# Patient Record
Sex: MALE | Race: WHITE | NOT HISPANIC OR LATINO | Employment: FULL TIME | URBAN - METROPOLITAN AREA
[De-identification: names, ages, dates, MRNs, and addresses within clinical notes are randomized per-mention and may not be internally consistent; named-entity substitution may affect disease eponyms.]

---

## 2017-10-03 ENCOUNTER — TRANSCRIBE ORDERS (OUTPATIENT)
Dept: ADMINISTRATIVE | Facility: HOSPITAL | Age: 42
End: 2017-10-03

## 2017-10-03 ENCOUNTER — APPOINTMENT (OUTPATIENT)
Dept: RADIOLOGY | Facility: MEDICAL CENTER | Age: 42
End: 2017-10-03
Payer: COMMERCIAL

## 2017-10-03 DIAGNOSIS — M54.5 LOW BACK PAIN, UNSPECIFIED BACK PAIN LATERALITY, UNSPECIFIED CHRONICITY, WITH SCIATICA PRESENCE UNSPECIFIED: Primary | ICD-10-CM

## 2017-10-03 DIAGNOSIS — M54.5 LOW BACK PAIN, UNSPECIFIED BACK PAIN LATERALITY, UNSPECIFIED CHRONICITY, WITH SCIATICA PRESENCE UNSPECIFIED: ICD-10-CM

## 2017-10-03 PROCEDURE — 72100 X-RAY EXAM L-S SPINE 2/3 VWS: CPT

## 2025-03-30 ENCOUNTER — HOSPITAL ENCOUNTER (EMERGENCY)
Facility: HOSPITAL | Age: 50
Discharge: HOME | End: 2025-03-30
Attending: EMERGENCY MEDICINE
Payer: COMMERCIAL

## 2025-03-30 ENCOUNTER — APPOINTMENT (EMERGENCY)
Dept: NON INVASIVE DIAGNOSTICS | Facility: HOSPITAL | Age: 50
End: 2025-03-30
Payer: COMMERCIAL

## 2025-03-30 VITALS
TEMPERATURE: 98.6 F | SYSTOLIC BLOOD PRESSURE: 130 MMHG | WEIGHT: 223.11 LBS | DIASTOLIC BLOOD PRESSURE: 80 MMHG | RESPIRATION RATE: 18 BRPM | HEART RATE: 70 BPM | OXYGEN SATURATION: 96 %

## 2025-03-30 DIAGNOSIS — I82.451 ACUTE DEEP VEIN THROMBOSIS (DVT) OF RIGHT PERONEAL VEIN (HCC): Primary | ICD-10-CM

## 2025-03-30 LAB
ALBUMIN SERPL BCG-MCNC: 4.5 G/DL (ref 3.5–5)
ALP SERPL-CCNC: 40 U/L (ref 34–104)
ALT SERPL W P-5'-P-CCNC: 26 U/L (ref 7–52)
ANION GAP SERPL CALCULATED.3IONS-SCNC: 4 MMOL/L (ref 4–13)
APTT PPP: 26 SECONDS (ref 23–34)
AST SERPL W P-5'-P-CCNC: 22 U/L (ref 13–39)
BASOPHILS # BLD AUTO: 0.03 THOUSANDS/ÂΜL (ref 0–0.1)
BASOPHILS NFR BLD AUTO: 1 % (ref 0–1)
BILIRUB SERPL-MCNC: 0.6 MG/DL (ref 0.2–1)
BUN SERPL-MCNC: 15 MG/DL (ref 5–25)
CALCIUM SERPL-MCNC: 9.2 MG/DL (ref 8.4–10.2)
CHLORIDE SERPL-SCNC: 104 MMOL/L (ref 96–108)
CO2 SERPL-SCNC: 29 MMOL/L (ref 21–32)
CREAT SERPL-MCNC: 0.94 MG/DL (ref 0.6–1.3)
EOSINOPHIL # BLD AUTO: 0.1 THOUSAND/ÂΜL (ref 0–0.61)
EOSINOPHIL NFR BLD AUTO: 2 % (ref 0–6)
ERYTHROCYTE [DISTWIDTH] IN BLOOD BY AUTOMATED COUNT: 12.1 % (ref 11.6–15.1)
GFR SERPL CREATININE-BSD FRML MDRD: 93 ML/MIN/1.73SQ M
GLUCOSE SERPL-MCNC: 103 MG/DL (ref 65–140)
HCT VFR BLD AUTO: 42 % (ref 36.5–49.3)
HGB BLD-MCNC: 14 G/DL (ref 12–17)
IMM GRANULOCYTES # BLD AUTO: 0.02 THOUSAND/UL (ref 0–0.2)
IMM GRANULOCYTES NFR BLD AUTO: 0 % (ref 0–2)
INR PPP: 0.98 (ref 0.85–1.19)
LYMPHOCYTES # BLD AUTO: 1.55 THOUSANDS/ÂΜL (ref 0.6–4.47)
LYMPHOCYTES NFR BLD AUTO: 27 % (ref 14–44)
MCH RBC QN AUTO: 30.8 PG (ref 26.8–34.3)
MCHC RBC AUTO-ENTMCNC: 33.3 G/DL (ref 31.4–37.4)
MCV RBC AUTO: 92 FL (ref 82–98)
MONOCYTES # BLD AUTO: 0.42 THOUSAND/ÂΜL (ref 0.17–1.22)
MONOCYTES NFR BLD AUTO: 7 % (ref 4–12)
NEUTROPHILS # BLD AUTO: 3.53 THOUSANDS/ÂΜL (ref 1.85–7.62)
NEUTS SEG NFR BLD AUTO: 63 % (ref 43–75)
NRBC BLD AUTO-RTO: 0 /100 WBCS
PLATELET # BLD AUTO: 170 THOUSANDS/UL (ref 149–390)
PMV BLD AUTO: 10.5 FL (ref 8.9–12.7)
POTASSIUM SERPL-SCNC: 4.6 MMOL/L (ref 3.5–5.3)
PROT SERPL-MCNC: 6.7 G/DL (ref 6.4–8.4)
PROTHROMBIN TIME: 13.2 SECONDS (ref 12.3–15)
RBC # BLD AUTO: 4.55 MILLION/UL (ref 3.88–5.62)
SODIUM SERPL-SCNC: 137 MMOL/L (ref 135–147)
WBC # BLD AUTO: 5.65 THOUSAND/UL (ref 4.31–10.16)

## 2025-03-30 PROCEDURE — 36415 COLL VENOUS BLD VENIPUNCTURE: CPT

## 2025-03-30 PROCEDURE — 85610 PROTHROMBIN TIME: CPT

## 2025-03-30 PROCEDURE — 93971 EXTREMITY STUDY: CPT

## 2025-03-30 PROCEDURE — 85025 COMPLETE CBC W/AUTO DIFF WBC: CPT

## 2025-03-30 PROCEDURE — 93971 EXTREMITY STUDY: CPT | Performed by: SURGERY

## 2025-03-30 PROCEDURE — 80053 COMPREHEN METABOLIC PANEL: CPT

## 2025-03-30 PROCEDURE — 99283 EMERGENCY DEPT VISIT LOW MDM: CPT

## 2025-03-30 PROCEDURE — 99284 EMERGENCY DEPT VISIT MOD MDM: CPT | Performed by: EMERGENCY MEDICINE

## 2025-03-30 PROCEDURE — 85730 THROMBOPLASTIN TIME PARTIAL: CPT

## 2025-03-30 RX ORDER — OXYCODONE HYDROCHLORIDE 5 MG/1
5 TABLET ORAL EVERY 6 HOURS PRN
Qty: 12 TABLET | Refills: 0 | Status: SHIPPED | OUTPATIENT
Start: 2025-03-30 | End: 2025-04-09

## 2025-03-30 RX ADMIN — APIXABAN 10 MG: 5 TABLET, FILM COATED ORAL at 13:26

## 2025-03-30 NOTE — DISCHARGE INSTRUCTIONS
Take eliquis as directed.    Follow-up with vascular specialists. Call to schedule an appointment.    Return for new or worsening symptoms. Including but not limited to chest pain, shortness of breath, worsening of pain, or change in location.

## 2025-03-30 NOTE — ED PROVIDER NOTES
Time reflects when diagnosis was documented in both MDM as applicable and the Disposition within this note       Time User Action Codes Description Comment    3/30/2025  1:15 PM Frannie Baker [I82.451] Acute deep vein thrombosis (DVT) of right peroneal vein (HCC)           ED Disposition       ED Disposition   Discharge    Condition   Stable    Date/Time   Sun Mar 30, 2025  1:14 PM    Comment   Aj Galicia discharge to home/self care.                   Assessment & Plan       Medical Decision Making  50-year-old male presenting to the emergency department for right lower extremity pain.  He is status post a right Denovo, ankle scope, bone graft, and bone marrow aspirate injection on 2/28/2025 through [facility] in [place].  He states that he recently flew to and from [place] arriving back in [place] yesterday.  He states that this morning he was woken with a sharp pain in the right calf.  He has been completely immobilized since the procedure.  He does take aspirin twice daily.  Otherwise denies blood thinners.  He states that this morning the area felt warm as compared to the other.  There is significant muscle atrophy so it is difficult to determine swelling/change in size.  He denies shortness of breath, chest pain, syncope.    Basic labs and extremity duplex obtained.  US shows acute thrombus in the paired peroneal veins from the distal to proximal calf without extension to the popliteal vein.  Eliquis first dose given in ED. Prescribed 1 month of treatment. Referral placed to vascular for follow-up.   Discussed medication side effects and supportive care. Described new or worsening symptoms that should indicate return.     Discussed findings from the visit with the patient.  We had a conversation regarding supportive care and indications for return.  Recommended appropriate follow-up.  Patient and/or family understand and agree with plan.    Portions of the record may have been created with  "voice recognition software. Occasional use of the incorrect word or \"sound a like\" substitutions may have occurred due to the inherent limitations of voice recognition software. Read the chart carefully and recognize, using context, where substitutions have occurred.         Amount and/or Complexity of Data Reviewed  Labs: ordered.    Risk  Prescription drug management.        ED Course as of 03/30/25 1922   Sun Mar 30, 2025   1255 Duplex: Acute thrombus in the paired peroneal veins from the distal to proximal calf without extension to the popliteal vein       Medications   apixaban (ELIQUIS) tablet 10 mg (10 mg Oral Given 3/30/25 1326)       ED Risk Strat Scores                            SBIRT 20yo+      Flowsheet Row Most Recent Value   Initial Alcohol Screen: US AUDIT-C     1. How often do you have a drink containing alcohol? 0 Filed at: 03/30/2025 1126   2. How many drinks containing alcohol do you have on a typical day you are drinking?  0 Filed at: 03/30/2025 1126   3a. Male UNDER 65: How often do you have five or more drinks on one occasion? 0 Filed at: 03/30/2025 1126   3b. FEMALE Any Age, or MALE 65+: How often do you have 4 or more drinks on one occassion? 0 Filed at: 03/30/2025 1126   Audit-C Score 0 Filed at: 03/30/2025 1126   ELVIN: How many times in the past year have you...    Used an illegal drug or used a prescription medication for non-medical reasons? Never Filed at: 03/30/2025 1126                            History of Present Illness       Chief Complaint   Patient presents with    Leg Pain     Patient had surgery on right leg 1 month ago. Woke up with right calf pain over night. Patient reports warm to touch. Flew back from [place] last night.        Past Medical History:   Diagnosis Date    Osteochondral defect of talus       Past Surgical History:   Procedure Laterality Date    FOOT SURGERY Right       History reviewed. No pertinent family history.   Social History     Tobacco Use    Smoking " status: Never    Smokeless tobacco: Never   Vaping Use    Vaping status: Never Used   Substance Use Topics    Alcohol use: Yes     Comment: social    Drug use: Never      E-Cigarette/Vaping    E-Cigarette Use Never User       E-Cigarette/Vaping Substances    Nicotine No     THC No     CBD No     Flavoring No     Other No     Unknown No       I have reviewed and agree with the history as documented.     Patient presents with:  Leg Pain: Patient had surgery on right leg 1 month ago. Woke up with right calf pain over night. Patient reports warm to touch. Flew back from [place] last night.           Leg Pain  Associated symptoms: no fever        Review of Systems   Constitutional:  Negative for chills and fever.   Respiratory:  Negative for chest tightness and shortness of breath.    Cardiovascular:  Negative for chest pain.   Gastrointestinal:  Negative for nausea and vomiting.   Musculoskeletal:  Positive for arthralgias and gait problem. Negative for joint swelling.   Skin:  Negative for color change, rash and wound.   Neurological:  Negative for syncope, weakness and numbness.   All other systems reviewed and are negative.          Objective       ED Triage Vitals   Temperature Pulse Blood Pressure Respirations SpO2 Patient Position - Orthostatic VS   03/30/25 1057 03/30/25 1059 03/30/25 1059 03/30/25 1059 03/30/25 1059 --   98.6 °F (37 °C) 70 130/80 18 96 %       Temp Source Heart Rate Source BP Location FiO2 (%) Pain Score    03/30/25 1057 03/30/25 1059 -- -- --    Oral Monitor         Vitals      Date and Time Temp Pulse SpO2 Resp BP Pain Score FACES Pain Rating User   03/30/25 1059 -- 70 96 % 18 130/80 -- -- KG   03/30/25 1057 98.6 °F (37 °C) -- -- -- -- -- -- KG            Physical Exam  Vitals and nursing note reviewed.   Constitutional:       General: He is not in acute distress.     Appearance: He is well-developed.   HENT:      Head: Normocephalic and atraumatic.   Eyes:      Conjunctiva/sclera:  Conjunctivae normal.   Cardiovascular:      Rate and Rhythm: Normal rate and regular rhythm.      Pulses: Normal pulses.   Pulmonary:      Effort: Pulmonary effort is normal. No respiratory distress.   Abdominal:      Palpations: Abdomen is soft.   Musculoskeletal:      Cervical back: Neck supple.      Right knee: Normal.      Left knee: Normal.      Right lower leg: Tenderness present. No swelling.      Left lower leg: No swelling.      Right ankle: Tenderness present. Decreased range of motion. Normal pulse.      Left ankle: No tenderness. Normal range of motion. Normal pulse.      Comments: 2+ DP, PT pulses bilaterally. Pt unable to move ankle since surgery. Full ROM of knee and toes. Sensation intact.   Skin:     General: Skin is warm and dry.      Capillary Refill: Capillary refill takes less than 2 seconds.   Neurological:      Mental Status: He is alert.   Psychiatric:         Mood and Affect: Mood normal.         Results Reviewed       Procedure Component Value Units Date/Time    Comprehensive metabolic panel [[id number]] Collected: 03/30/25 1128    Lab Status: Final result Specimen: Blood from Arm, Right Updated: 03/30/25 1154     Sodium 137 mmol/L      Potassium 4.6 mmol/L      Chloride 104 mmol/L      CO2 29 mmol/L      ANION GAP 4 mmol/L      BUN 15 mg/dL      Creatinine 0.94 mg/dL      Glucose 103 mg/dL      Calcium 9.2 mg/dL      AST 22 U/L      ALT 26 U/L      Alkaline Phosphatase 40 U/L      Total Protein 6.7 g/dL      Albumin 4.5 g/dL      Total Bilirubin 0.60 mg/dL      eGFR 93 ml/min/1.73sq m     Narrative:      National Kidney Disease Foundation guidelines for Chronic Kidney Disease (CKD):     Stage 1 with normal or high GFR (GFR > 90 mL/min/1.73 square meters)    Stage 2 Mild CKD (GFR = 60-89 mL/min/1.73 square meters)    Stage 3A Moderate CKD (GFR = 45-59 mL/min/1.73 square meters)    Stage 3B Moderate CKD (GFR = 30-44 mL/min/1.73 square meters)    Stage 4 Severe CKD (GFR = 15-29 mL/min/1.73  square meters)    Stage 5 End Stage CKD (GFR <15 mL/min/1.73 square meters)  Note: GFR calculation is accurate only with a steady state creatinine    Protime-INR [[id number]]  (Normal) Collected: 03/30/25 1128    Lab Status: Final result Specimen: Blood from Arm, Right Updated: 03/30/25 1147     Protime 13.2 seconds      INR 0.98    Narrative:      INR Therapeutic Range    Indication                                             INR Range      Atrial Fibrillation                                               2.0-3.0  Hypercoagulable State                                    2.0.2.3  Left Ventricular Asist Device                            2.0-3.0  Mechanical Heart Valve                                  -    Aortic(with afib, MI, embolism, HF, LA enlargement,    and/or coagulopathy)                                     2.0-3.0 (2.5-3.5)     Mitral                                                             2.5-3.5  Prosthetic/Bioprosthetic Heart Valve               2.0-3.0  Venous thromboembolism (VTE: VT, PE        2.0-3.0    APTT [[id number]]  (Normal) Collected: 03/30/25 1128    Lab Status: Final result Specimen: Blood from Arm, Right Updated: 03/30/25 1147     PTT 26 seconds     CBC and differential [[id number]] Collected: 03/30/25 1128    Lab Status: Final result Specimen: Blood from Arm, Right Updated: 03/30/25 1134     WBC 5.65 Thousand/uL      RBC 4.55 Million/uL      Hemoglobin 14.0 g/dL      Hematocrit 42.0 %      MCV 92 fL      MCH 30.8 pg      MCHC 33.3 g/dL      RDW 12.1 %      MPV 10.5 fL      Platelets 170 Thousands/uL      nRBC 0 /100 WBCs      Segmented % 63 %      Immature Grans % 0 %      Lymphocytes % 27 %      Monocytes % 7 %      Eosinophils Relative 2 %      Basophils Relative 1 %      Absolute Neutrophils 3.53 Thousands/µL      Absolute Immature Grans 0.02 Thousand/uL      Absolute Lymphocytes 1.55 Thousands/µL      Absolute Monocytes 0.42 Thousand/µL      Eosinophils Absolute 0.10 Thousand/µL       Basophils Absolute 0.03 Thousands/µL             VAS lower limb venous duplex study, unilateral/limited   Final Interpretation by Cristhian Mckeon MD ( [id number])          Procedures    ED Medication and Procedure Management   Prior to Admission Medications   Prescriptions Last Dose Informant Patient Reported? Taking?   cefdinir (OMNICEF) 300 mg capsule   Yes No   Sig: Take 300 mg by mouth every 12 (twelve) hours   ofloxacin (FLOXIN) 0.3 % otic solution   Yes No   Sig: 10 drops 2 (two) times a day   predniSONE 10 mg tablet   No No   Si tabs daily for 14 days      Facility-Administered Medications: None     Discharge Medication List as of 3/30/2025  1:23 PM        START taking these medications    Details   apixaban (Eliquis) 5 mg Multiple Dosages:Starting Sun 3/30/2025, Until Sat 2025 at 2359, THEN Starting Sun 2025, Until Sat 2025 at 2359Take 2 tablets (10 mg total) by mouth 2 (two) times a day for 7 days, THEN 1 tablet (5 mg total) 2 (two) times a day for 21 day s., Normal           CONTINUE these medications which have NOT CHANGED    Details   cefdinir (OMNICEF) 300 mg capsule Take 300 mg by mouth every 12 (twelve) hours, Historical Med      ofloxacin (FLOXIN) 0.3 % otic solution 10 drops 2 (two) times a day, Historical Med      predniSONE 10 mg tablet 4 tabs daily for 14 days, Normal             ED SEPSIS DOCUMENTATION   Time reflects when diagnosis was documented in both MDM as applicable and the Disposition within this note       Time User Action Codes Description Comment    3/30/2025  1:15 PM Frannie Baker Add [I82.451] Acute deep vein thrombosis (DVT) of right peroneal vein (HCC)                  Frannie Baker PA-C  25

## 2025-03-31 ENCOUNTER — TELEPHONE (OUTPATIENT)
Age: 50
End: 2025-03-31

## 2025-03-31 ENCOUNTER — NURSE TRIAGE (OUTPATIENT)
Age: 50
End: 2025-03-31

## 2025-03-31 NOTE — TELEPHONE ENCOUNTER
Pt called back. Gave provider's full message. Pt verbalized understanding.     Saw that referral is ASAP- changed appointment to 4/10 at the [place] office.

## 2025-03-31 NOTE — TELEPHONE ENCOUNTER
"Hello,    The following message was sent via e-mail to the leadership team:     Please advise if you can help facilitate the following overbook request:    Patient Name:  Aj Galicia    Patient MRN: [id number]   Call back #: [phone]    Insurance:  [employer]    Department:Vascular    Speciality: DVT     Reason for overbook request: STAT REFERRAL    Comments (Write \"N/a\" if no comments):  Patient is referral states ASAP.     Requested doctor and location:  [place] / Any provider     Date of current appointment: 5/28/2025      Thank you.    PLEASE ENSURE YOU EXHAUST ALL SCHEDULING OPTIONS PRIOR TO SENDING OVERBOOK REQUEST (DELETE THIS PRIOR TO ADDING TO CHART AND SENDING VIA E-MAIL)   "

## 2025-03-31 NOTE — TELEPHONE ENCOUNTER
New patient in [facility] ED yesterday positive for DVT in right leg. Scheduled for appt May 28th and overbook request was placed by . Pt prescribed Eliquis for 30 days and asking what to do in the meantime until the appt.

## 2025-03-31 NOTE — TELEPHONE ENCOUNTER
Reviewed. Recommend daily compression stockings, leg elevation, regular ambulation, and OTC tylenol PRN. He should avoid heavy lifting or strenuous exercise. He may reach out to his PCP for further refills of Eliquis, if needed. If his symptoms worsen or he develops SOB or chest pain, he should go back to the ER.    good balance

## 2025-03-31 NOTE — TELEPHONE ENCOUNTER
----- Message from Kaylee MAYES sent at 3/31/2025  8:27 AM EDT -----  Patient calling in to have a appointment scheduled for DVT next open appointment isn't till May. Patient was placed on wait-list and a overbook request was sent pt would like advise on what to do in the meanwhile.

## 2025-04-10 ENCOUNTER — OFFICE VISIT (OUTPATIENT)
Dept: VASCULAR SURGERY | Facility: CLINIC | Age: 50
End: 2025-04-10
Payer: COMMERCIAL

## 2025-04-10 VITALS
WEIGHT: 223 LBS | HEART RATE: 70 BPM | HEIGHT: 71 IN | SYSTOLIC BLOOD PRESSURE: 118 MMHG | DIASTOLIC BLOOD PRESSURE: 78 MMHG | BODY MASS INDEX: 31.22 KG/M2

## 2025-04-10 DIAGNOSIS — I82.451 ACUTE DEEP VEIN THROMBOSIS (DVT) OF RIGHT PERONEAL VEIN (HCC): ICD-10-CM

## 2025-04-10 PROCEDURE — 99204 OFFICE O/P NEW MOD 45 MIN: CPT | Performed by: NURSE PRACTITIONER

## 2025-04-10 NOTE — PROGRESS NOTES
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 Healed ankle incision  -Initial occurrence of DVT, provoked secondary to surgery, non weight bearing status and round trip flight from [place] to [place]. (Travels frequently for work)  -Recommended Eliquis x 3 months. Script sent to pharmacy.    -Repeat LEVD in 3 months   -Rx 20-30mmHg compression. Wear daily and remove at night  -Follow up after duplex  -Any new symptoms notify the office     Orders:    Ambulatory Referral to Vascular Surgery    VAS VENOUS DUPLEX -LOWER LIMB UNILATERAL; Future    apixaban (Eliquis) 5 mg; Take 1 tablet (5 mg total) by mouth 2 (two) times a day    Compression Stocking        Chief Complaint : New patient presents to review LEV for acute RLE DVT.    History of Present Illness   Aj Galicia is a 50 y.o. male who presents to the office for management of right lower extremity acute DVT.  Patient is status post right ankle scope, bone graft and bone marrow aspirate injection 2025 for osteochondritis dissecans.  He is nonweightbearing X 12 weeks w/ boot.  He travels for work and recently flew from [place] to [place] and back.  Upon return he had right lateral calf pain and discomfort that woke him from sleep.  He was evaluated in the ED which showed acute DVT paired peroneal veins distal  "to proximal calf without extension into popliteal vein.  He was started on loading dose of Eliquis and now on therapeutic dosing.  He is utilizing OTC compression.    History obtained from: patient    Review of Systems   Constitutional: Negative.    HENT: Negative.     Eyes: Negative.    Respiratory: Negative.     Cardiovascular: Negative.    Gastrointestinal: Negative.    Endocrine: Negative.    Genitourinary: Negative.    Musculoskeletal:  Positive for gait problem.   Skin: Negative.    Allergic/Immunologic: Negative.    Hematological: Negative.    Psychiatric/Behavioral: Negative.       Medical History Reviewed by provider this encounter:  Tobacco  Allergies  Meds  Problems  Med Hx  Surg Hx  Fam Hx     .     Objective   I have reviewed and made appropriate changes to the review of systems input by the medical assistant.    Vitals:    04/10/25 0927   BP: 118/78   BP Location: Left arm   Patient Position: Sitting   Cuff Size: Standard   Pulse: 70   Weight: 101 kg (223 lb)   Height: 5' 11\" (1.803 m)       Patient Active Problem List   Diagnosis    Acute deep vein thrombosis (DVT) of right peroneal vein (HCC)       Past Surgical History:   Procedure Laterality Date    FOOT SURGERY Right        History reviewed. No pertinent family history.    Social History     Socioeconomic History    Marital status: /Civil Union     Spouse name: Not on file    Number of children: Not on file    Years of education: Not on file    Highest education level: Not on file   Occupational History    Not on file   Tobacco Use    Smoking status: Never    Smokeless tobacco: Never   Vaping Use    Vaping status: Never Used   Substance and Sexual Activity    Alcohol use: Yes     Comment: social    Drug use: Never    Sexual activity: Not Currently   Other Topics Concern    Not on file   Social History Narrative    Not on file     Social Drivers of Health     Financial Resource Strain: Patient Declined (4/2/2025)    Received from " [facility]    Financial Insecurity     In the last 12 months did you skip medications to save money?: Decline to Answer     In the last 12 months was there a time when you needed to see a doctor but could not because of cost?: Decline to Answer   Food Insecurity: Patient Declined (4/2/2025)    Received from [facility]    Food Insecurity     In the last 12 months did you ever eat less than you felt you should because there wasn't enough money for food?: Decline to Answer   Transportation Needs: Patient Declined (4/2/2025)    Received from [facility]    Transportation Needs     In the last 12 months have you ever had to go without healthcare because you didn't have a way to get there?: Decline to Answer   Physical Activity: Not on file   Stress: Not on file   Social Connections: Patient Declined (4/2/2025)    Received from [facility]    Social Connection     Do you often feel lonely?: Decline to Answer   Intimate Partner Violence: Not on file   Housing Stability: Patient Declined (4/2/2025)    Received from [facility]    Housing Stability     Are you worried that in the next 2 months you may not have stable housing?: Decline to Answer       Allergies   Allergen Reactions    Iodinated Contrast Media Hives    Shellfish-Derived Products - Food Allergy Hives         Current Outpatient Medications:     apixaban (Eliquis) 5 mg, Take 2 tablets (10 mg total) by mouth 2 (two) times a day for 7 days, THEN 1 tablet (5 mg total) 2 (two) times a day for 21 days., Disp: 70 tablet, Rfl: 0    apixaban (Eliquis) 5 mg, Take 1 tablet (5 mg total) by mouth 2 (two) times a day, Disp: 30 tablet, Rfl: 3    ofloxacin (FLOXIN) 0.3 % otic solution, 10 drops 2 (two) times a day, Disp: , Rfl:     cefdinir (OMNICEF) 300 mg capsule, Take 300 mg by mouth every 12 (twelve) hours (Patient not taking: Reported on 4/10/2025), Disp: , Rfl:     predniSONE 10 mg  "tablet, 4 tabs daily for 14 days (Patient not taking: Reported on 4/10/2025), Disp: 56 tablet, Rfl: 0    /78 (BP Location: Left arm, Patient Position: Sitting, Cuff Size: Standard)   Pulse 70   Ht 5' 11\" (1.803 m)   Wt 101 kg (223 lb)   BMI 31.10 kg/m²      Physical Exam  Vitals and nursing note reviewed.   Constitutional:       Appearance: Normal appearance.   HENT:      Head: Normocephalic and atraumatic.   Eyes:      Extraocular Movements: Extraocular movements intact.   Cardiovascular:      Pulses:           Dorsalis pedis pulses are 2+ on the right side.        Posterior tibial pulses are 2+ on the right side and 2+ on the left side.      Heart sounds: Normal heart sounds.   Pulmonary:      Effort: Pulmonary effort is normal.      Breath sounds: Normal breath sounds.   Musculoskeletal:      Comments: RLE boot, ambulating with crutches    Skin:     General: Skin is warm.   Neurological:      General: No focal deficit present.      Mental Status: He is alert and oriented to person, place, and time.   Psychiatric:         Mood and Affect: Mood normal.         Behavior: Behavior normal.         Administrative Statements   I have spent a total time of 25 minutes in caring for this patient on the day of the visit/encounter including Diagnostic results, Prognosis, Instructions for management, Patient and family education, Importance of tx compliance, Risk factor reductions, Impressions, Counseling / Coordination of care, and Documenting in the medical record.  "

## 2025-05-07 ENCOUNTER — TELEPHONE (OUTPATIENT)
Age: 50
End: 2025-05-07

## 2025-05-07 NOTE — TELEPHONE ENCOUNTER
Patient approximately 5 weeks s/p provoked R peroneal DVT. Currently taking anticoagulation.    Yes, okay to fly, do not skip anticoagulation doses.  Recommend compression during flight.

## 2025-05-07 NOTE — TELEPHONE ENCOUNTER
S/p DVT 3/30/25  Pt forgot to ask if he can fly? The flight will be approximately a 90 minute flight from [place] to [place]. Please advise

## 2025-05-27 ENCOUNTER — TELEPHONE (OUTPATIENT)
Age: 50
End: 2025-05-27

## 2025-05-27 NOTE — TELEPHONE ENCOUNTER
Caller: Aj    Doctor/Office:  None    Call regarding :  Rescheduling testing      Call was transferred to: Central scheduling

## 2025-06-09 DIAGNOSIS — I82.451 ACUTE DEEP VEIN THROMBOSIS (DVT) OF RIGHT PERONEAL VEIN (HCC): ICD-10-CM

## 2025-06-09 NOTE — TELEPHONE ENCOUNTER
Reason for call:   [x] Refill   [] Prior Auth  [] Other:     Office:   [] PCP/Provider -   [x] Specialty/Provider - [facility] center/Yarelis Stratton    Medication: apixaban (Eliquis) 5 mg     Dose/Frequency:     Take 1 tablet (5 mg total) by mouth 2 (two) times a day       Quantity: 60    Pharmacy: [id number] [address] [facility] [phone]     Local Pharmacy   Does the patient have enough for 3 days?   [x] Yes   [] No - Send as HP to POD    Mail Away Pharmacy   Does the patient have enough for 10 days?   [] Yes   [] No - Send as HP to POD

## 2025-06-26 ENCOUNTER — TELEPHONE (OUTPATIENT)
Age: 50
End: 2025-06-26

## 2025-06-26 NOTE — TELEPHONE ENCOUNTER
Patient called to make a follow up appt in the [place] location, only wants to be seen in the [facility], added patient to waitlist.Follow up after duplex- L/S 04/10/25-MBM-DVT

## 2025-06-27 NOTE — TELEPHONE ENCOUNTER
LMOM to get PT scheduled for a follow up. Asked PT to call back and ask to be connected to me in the [place] office.

## 2025-07-01 ENCOUNTER — HOSPITAL ENCOUNTER (OUTPATIENT)
Dept: NON INVASIVE DIAGNOSTICS | Facility: HOSPITAL | Age: 50
Discharge: HOME | End: 2025-07-01
Attending: NURSE PRACTITIONER
Payer: COMMERCIAL

## 2025-07-01 DIAGNOSIS — I82.451 ACUTE DEEP VEIN THROMBOSIS (DVT) OF RIGHT PERONEAL VEIN (HCC): ICD-10-CM

## 2025-07-01 PROCEDURE — 93971 EXTREMITY STUDY: CPT

## 2025-07-01 PROCEDURE — 93971 EXTREMITY STUDY: CPT | Performed by: SURGERY

## 2025-07-01 NOTE — TELEPHONE ENCOUNTER
Patient called back to speak with Stew, if he can just give him a call back when he is available. Thanks!

## 2025-08-04 ENCOUNTER — EVALUATION (OUTPATIENT)
Dept: PHYSICAL THERAPY | Facility: MEDICAL CENTER | Age: 50
End: 2025-08-04
Payer: COMMERCIAL

## 2025-08-04 DIAGNOSIS — Z47.89 ORTHOPEDIC AFTERCARE: ICD-10-CM

## 2025-08-04 DIAGNOSIS — M25.571 RIGHT ANKLE PAIN, UNSPECIFIED CHRONICITY: Primary | ICD-10-CM

## 2025-08-04 PROCEDURE — 97112 NEUROMUSCULAR REEDUCATION: CPT

## 2025-08-04 PROCEDURE — 97140 MANUAL THERAPY 1/> REGIONS: CPT

## 2025-08-04 PROCEDURE — 97161 PT EVAL LOW COMPLEX 20 MIN: CPT

## 2025-08-11 ENCOUNTER — OFFICE VISIT (OUTPATIENT)
Dept: PHYSICAL THERAPY | Facility: MEDICAL CENTER | Age: 50
End: 2025-08-11
Attending: ORTHOPAEDIC SURGERY
Payer: COMMERCIAL

## 2025-08-14 ENCOUNTER — OFFICE VISIT (OUTPATIENT)
Dept: PHYSICAL THERAPY | Facility: MEDICAL CENTER | Age: 50
End: 2025-08-14
Attending: ORTHOPAEDIC SURGERY
Payer: COMMERCIAL

## 2025-08-19 ENCOUNTER — OFFICE VISIT (OUTPATIENT)
Dept: PHYSICAL THERAPY | Facility: MEDICAL CENTER | Age: 50
End: 2025-08-19
Attending: ORTHOPAEDIC SURGERY
Payer: COMMERCIAL

## 2025-08-19 DIAGNOSIS — Z47.89 ORTHOPEDIC AFTERCARE: ICD-10-CM

## 2025-08-19 DIAGNOSIS — M25.571 RIGHT ANKLE PAIN, UNSPECIFIED CHRONICITY: Primary | ICD-10-CM

## 2025-08-19 PROCEDURE — 97110 THERAPEUTIC EXERCISES: CPT

## 2025-08-19 PROCEDURE — 97112 NEUROMUSCULAR REEDUCATION: CPT

## 2025-08-19 PROCEDURE — 97140 MANUAL THERAPY 1/> REGIONS: CPT
